# Patient Record
Sex: FEMALE | Race: WHITE | NOT HISPANIC OR LATINO | URBAN - METROPOLITAN AREA
[De-identification: names, ages, dates, MRNs, and addresses within clinical notes are randomized per-mention and may not be internally consistent; named-entity substitution may affect disease eponyms.]

---

## 2019-07-15 ENCOUNTER — OFFICE VISIT - RIVER FALLS (OUTPATIENT)
Dept: FAMILY MEDICINE | Facility: CLINIC | Age: 39
End: 2019-07-15

## 2019-07-15 ASSESSMENT — MIFFLIN-ST. JEOR: SCORE: 1804.88

## 2019-07-17 ENCOUNTER — COMMUNICATION - RIVER FALLS (OUTPATIENT)
Dept: FAMILY MEDICINE | Facility: CLINIC | Age: 39
End: 2019-07-17

## 2019-07-17 LAB
GAMMA INTERFERON BACKGROUND BLD IA-ACNC: 0.02 IU/ML
M TB IFN-G BLD-IMP: NEGATIVE
M TB IFN-G CD4+ BCKGRND COR BLD-ACNC: 9.09 IU/ML
MITOGEN IGNF BCKGRD COR BLD-ACNC: 0.06 IU/ML
MITOGEN IGNF BCKGRD COR BLD-ACNC: 0.09 IU/ML

## 2022-02-11 VITALS
SYSTOLIC BLOOD PRESSURE: 128 MMHG | BODY MASS INDEX: 47.73 KG/M2 | HEART RATE: 84 BPM | HEIGHT: 62 IN | TEMPERATURE: 99.3 F | WEIGHT: 259.4 LBS | DIASTOLIC BLOOD PRESSURE: 70 MMHG

## 2022-02-16 NOTE — PROGRESS NOTES
Patient:   NITZA QUEVEDO            MRN: [id number]            FIN: [id number]               Age:   38 years     Sex:  Female     :  1980   Associated Diagnoses:   Screening for tuberculosis; Pre-employment examination   Author:   Tom Lee PA-C      Visit Information      Date of Service: 07/15/2019 09:00 am  Performing Location: [facility]  Encounter#: [id number]      Primary Care Provider (PCP):  NONE ,       Referring Provider:  No referring provider recorded for selected visit.   Visit type:  Pre-employment exam   .    Accompanied by:  No one.    Source of history:  Self.    Referral source:  Self.    History limitation:  None.       Chief Complaint   7/15/2019 9:03 AM CDT    Pre-employment Px, back screen, Quantiferon gold        Well Adult History   Well Adult History             The patient presents for well adult exam.  The patient's general health status is described as good.  The patient's diet is described as balanced.  Exercise: occasional.  Additional pertinent history: daily caffeine use, tobacco use none and alcohol use socially.        Review of Systems   Constitutional:  Negative.    Eye:  Negative.    Respiratory:  Negative.    Cardiovascular:  Negative.    Breast:  Negative.    Gastrointestinal:  Negative.    Genitourinary:  Negative.    Gynecologic:  Negative.    Hematology/Lymphatics:  Negative.    Endocrine:  Negative.    Immunologic:  Negative.    Musculoskeletal:  Negative.    Integumentary:  Negative.    Neurologic:  Negative.    Psychiatric:  Negative.       Health Status   Allergies:    Allergic Reactions (All)  No Known Medication Allergies   Medications:  (Selected)   Documented Medications  Documented  PROzac 40 mg oral capsule: = 1 cap(s) ( 40 mg ), Oral, daily, 0 Refill(s), Type: Maintenance,    Medications          *denotes recorded medication          *PROzac 40 mg oral capsule: 40 mg, 1 cap(s), Oral, daily, 0 Refill(s).     Problem list:    All  Problems  Morbid obesity / SNOMED CT [id number] / Probable      Histories   Past Medical History:    No active or resolved past medical history items have been selected or recorded.   Family History:    No family history items have been selected or recorded.   Procedure history:    No active procedure history items have been selected or recorded.   Social History:             No active social history items have been recorded.      Physical Examination   Vital Signs   7/15/2019 9:03 AM CDT Temperature Tympanic 99.3 DegF    Peripheral Pulse Rate 84 bpm    Pulse Site Radial artery    HR Method Manual    Systolic Blood Pressure 128 mmHg    Diastolic Blood Pressure 70 mmHg    Mean Arterial Pressure 89 mmHg    BP Site Right arm    BP Method Manual      Measurements from flowsheet : Measurements   7/15/2019 9:03 AM CDT Height Measured - Standard 62 in    Weight Measured - Standard 259.4 lb    BSA 2.27 m2    Body Mass Index 47.44 kg/m2  HI      General:  Alert and oriented, No acute distress.    Eye:  Pupils are equal, round and reactive to light, Extraocular movements are intact, Normal conjunctiva.    HENT:  Normocephalic, Tympanic membranes are clear, Normal hearing, Oral mucosa is moist, No pharyngeal erythema.    Neck:  Supple, Non-tender, No lymphadenopathy, No thyromegaly.    Respiratory:  Lungs are clear to auscultation, Respirations are non-labored, Breath sounds are equal.    Cardiovascular:  Normal rate, Regular rhythm, No murmur.    Gastrointestinal:  Soft, Non-tender, Non-distended, No organomegaly.    Genitourinary:  No costovertebral angle tenderness.    Musculoskeletal:  Normal range of motion, Normal strength, No tenderness, No swelling, Scored a 7 on low back screen, low risk of back injury..    Integumentary:  Warm, Pink, No rash.    Neurologic:  Alert, Oriented, Normal sensory, Normal motor function, No focal deficits.    Psychiatric:  Cooperative, Appropriate mood & affect.       Impression and Plan    Diagnosis     Screening for tuberculosis ([id number] Z11.1).     Pre-employment examination (DCM42-CE Z02.1).     Patient Instructions:       Counseled: Patient, Verbalized understanding.    See form in chart. cleared without restriction.

## 2022-02-16 NOTE — NURSING NOTE
Comprehensive Intake Entered On:  7/15/2019 9:18 AM CDT    Performed On:  7/15/2019 9:03 AM CDT by Jennifer Lake MA               Summary   Chief Complaint :   Pre-employment Px, back screen, Quantiferon gold   Weight Measured :   259.4 lb(Converted to: 259 lb 6 oz, 117.66 kg)    Height Measured :   62 in(Converted to: 5 ft 2 in, 157.48 cm)    Body Mass Index :   47.44 kg/m2 (HI)    Body Surface Area :   2.27 m2   Systolic Blood Pressure :   128 mmHg   Diastolic Blood Pressure :   70 mmHg   Mean Arterial Pressure :   89 mmHg   Peripheral Pulse Rate :   84 bpm   BP Site :   Right arm   Pulse Site :   Radial artery   BP Method :   Manual   HR Method :   Manual   Temperature Tympanic :   99.3 DegF(Converted to: 37.4 DegC)    Jennifer Lake MA - 7/15/2019 9:03 AM CDT   Health Status   Allergies Verified? :   Yes   Medication History Verified? :   Yes   Medical History Verified? :   Yes   Pre-Visit Planning Status :   N/A   Tobacco Use? :   Never smoker   Jennifer Lake MA - 7/15/2019 9:03 AM CDT   Consents   Consent for Immunization Exchange :   Consent Granted   Consent for Immunizations to Providers :   Consent Granted   Jennifer Lake MA - 7/15/2019 9:03 AM CDT   Meds / Allergies   (As Of: 7/15/2019 9:18:34 AM CDT)   Allergies (Active)   No Known Medication Allergies  Estimated Onset Date:   Unspecified ; Created By:   Jennifer Lake MA; Reaction Status:   Active ; Category:   Drug ; Substance:   No Known Medication Allergies ; Type:   Allergy ; Updated By:   Jennifer Lake MA; Reviewed Date:   7/15/2019 9:14 AM CDT        Medication List   (As Of: 7/15/2019 9:18:34 AM CDT)   Home Meds    FLUoxetine  :   FLUoxetine ; Status:   Documented ; Ordered As Mnemonic:   PROzac 40 mg oral capsule ; Simple Display Line:   40 mg, 1 cap(s), Oral, daily, 0 Refill(s) ; Catalog Code:   FLUoxetine ; Order Dt/Tm:   7/15/2019 9:14:41 AM            Vision Testing POC   Corrective Lenses :   Glasses   Color Blind Correct Plates :    14/14   Eye, Left with Correction Visual Acuity :   20/20   Eye, Right with Correction Visual Acuity :   20/20   Eye, Bilat w/Correction Visual Acuity :   20/15   Jennifer Lake MA - 7/15/2019 9:03 AM CDT

## 2022-02-16 NOTE — LETTER
(Inserted Image. Unable to display)   [address] [place]  [address]  [phone]    July 17, 2019      NITZA QUEVEDO       [place], [place] [id number]        Dear NITZA,    Thank you for selecting [facility] for your healthcare needs. Below you will find the result of your recent test(s) done at our clinic.     Your Tuberculosis test was negative.      Result Name Current Result Reference Range   QuantiFERON TB Gold  NEGATIVE 7/15/2019 NEGATIVE - NEGATIVE   QuantiFERON Nil Value (IU/mL)  0.02 7/15/2019    QuantiFERON Mitogen Minus Nil (IU/mL)  9.09 7/15/2019    QuantiFERON- TB minus NIL (IU/mL)  0.09 7/15/2019    Quantiferon-TB2 minus NIL (IU/mL)  0.06 7/15/2019        Please contact my practice at [phone] if you have any questions or concerns.      Sincerely,        Silvano Coppola MD ,   Janette Pascual MD,   Tom Lee PA-C